# Patient Record
Sex: MALE | HISPANIC OR LATINO | Employment: UNEMPLOYED | URBAN - METROPOLITAN AREA
[De-identification: names, ages, dates, MRNs, and addresses within clinical notes are randomized per-mention and may not be internally consistent; named-entity substitution may affect disease eponyms.]

---

## 2024-01-24 ENCOUNTER — HOSPITAL ENCOUNTER (EMERGENCY)
Facility: MEDICAL CENTER | Age: 29
End: 2024-01-24
Attending: EMERGENCY MEDICINE

## 2024-01-24 VITALS
DIASTOLIC BLOOD PRESSURE: 79 MMHG | HEART RATE: 88 BPM | RESPIRATION RATE: 17 BRPM | WEIGHT: 133.16 LBS | SYSTOLIC BLOOD PRESSURE: 126 MMHG | OXYGEN SATURATION: 97 % | TEMPERATURE: 98.4 F

## 2024-01-24 DIAGNOSIS — N34.2 URETHRITIS: ICD-10-CM

## 2024-01-24 DIAGNOSIS — R74.01 TRANSAMINITIS: ICD-10-CM

## 2024-01-24 LAB
ALBUMIN SERPL BCP-MCNC: 5.1 G/DL (ref 3.2–4.9)
ALBUMIN/GLOB SERPL: 1.5 G/DL
ALP SERPL-CCNC: 109 U/L (ref 30–99)
ALT SERPL-CCNC: 147 U/L (ref 2–50)
ANION GAP SERPL CALC-SCNC: 18 MMOL/L (ref 7–16)
APPEARANCE UR: ABNORMAL
AST SERPL-CCNC: 283 U/L (ref 12–45)
BACTERIA #/AREA URNS HPF: ABNORMAL /HPF
BASOPHILS # BLD AUTO: 0.8 % (ref 0–1.8)
BASOPHILS # BLD: 0.06 K/UL (ref 0–0.12)
BILIRUB SERPL-MCNC: 2.6 MG/DL (ref 0.1–1.5)
BILIRUB UR QL STRIP.AUTO: ABNORMAL
BUN SERPL-MCNC: 12 MG/DL (ref 8–22)
C TRACH DNA SPEC QL NAA+PROBE: NEGATIVE
CALCIUM ALBUM COR SERPL-MCNC: 9.5 MG/DL (ref 8.5–10.5)
CALCIUM SERPL-MCNC: 10.4 MG/DL (ref 8.5–10.5)
CHLORIDE SERPL-SCNC: 92 MMOL/L (ref 96–112)
CO2 SERPL-SCNC: 23 MMOL/L (ref 20–33)
COLOR UR: ABNORMAL
CREAT SERPL-MCNC: 0.55 MG/DL (ref 0.5–1.4)
EOSINOPHIL # BLD AUTO: 0.11 K/UL (ref 0–0.51)
EOSINOPHIL NFR BLD: 1.5 % (ref 0–6.9)
EPI CELLS #/AREA URNS HPF: ABNORMAL /HPF
ERYTHROCYTE [DISTWIDTH] IN BLOOD BY AUTOMATED COUNT: 41.6 FL (ref 35.9–50)
GFR SERPLBLD CREATININE-BSD FMLA CKD-EPI: 137 ML/MIN/1.73 M 2
GLOBULIN SER CALC-MCNC: 3.5 G/DL (ref 1.9–3.5)
GLUCOSE SERPL-MCNC: 88 MG/DL (ref 65–99)
GLUCOSE UR STRIP.AUTO-MCNC: NEGATIVE MG/DL
HCT VFR BLD AUTO: 47.4 % (ref 42–52)
HGB BLD-MCNC: 16.6 G/DL (ref 14–18)
HIV 1+2 AB+HIV1 P24 AG SERPL QL IA: NORMAL
HYALINE CASTS #/AREA URNS LPF: ABNORMAL /LPF
IMM GRANULOCYTES # BLD AUTO: 0.01 K/UL (ref 0–0.11)
IMM GRANULOCYTES NFR BLD AUTO: 0.1 % (ref 0–0.9)
KETONES UR STRIP.AUTO-MCNC: >=160 MG/DL
LEUKOCYTE ESTERASE UR QL STRIP.AUTO: ABNORMAL
LYMPHOCYTES # BLD AUTO: 1.08 K/UL (ref 1–4.8)
LYMPHOCYTES NFR BLD: 14.4 % (ref 22–41)
MCH RBC QN AUTO: 33.2 PG (ref 27–33)
MCHC RBC AUTO-ENTMCNC: 35 G/DL (ref 32.3–36.5)
MCV RBC AUTO: 94.8 FL (ref 81.4–97.8)
MICRO URNS: ABNORMAL
MONOCYTES # BLD AUTO: 1 K/UL (ref 0–0.85)
MONOCYTES NFR BLD AUTO: 13.4 % (ref 0–13.4)
N GONORRHOEA DNA SPEC QL NAA+PROBE: NEGATIVE
NEUTROPHILS # BLD AUTO: 5.22 K/UL (ref 1.82–7.42)
NEUTROPHILS NFR BLD: 69.8 % (ref 44–72)
NITRITE UR QL STRIP.AUTO: POSITIVE
NRBC # BLD AUTO: 0 K/UL
NRBC BLD-RTO: 0 /100 WBC (ref 0–0.2)
PH UR STRIP.AUTO: 5.5 [PH] (ref 5–8)
PLATELET # BLD AUTO: 217 K/UL (ref 164–446)
PMV BLD AUTO: 10.6 FL (ref 9–12.9)
POTASSIUM SERPL-SCNC: 3.7 MMOL/L (ref 3.6–5.5)
PROT SERPL-MCNC: 8.6 G/DL (ref 6–8.2)
PROT UR QL STRIP: 300 MG/DL
RBC # BLD AUTO: 5 M/UL (ref 4.7–6.1)
RBC # URNS HPF: >150 /HPF
RBC UR QL AUTO: ABNORMAL
SODIUM SERPL-SCNC: 133 MMOL/L (ref 135–145)
SP GR UR STRIP.AUTO: 1.02
SPECIMEN SOURCE: NORMAL
T PALLIDUM AB SER QL IA: NORMAL
UROBILINOGEN UR STRIP.AUTO-MCNC: 1 MG/DL
WBC # BLD AUTO: 7.5 K/UL (ref 4.8–10.8)
WBC #/AREA URNS HPF: ABNORMAL /HPF

## 2024-01-24 PROCEDURE — 81001 URINALYSIS AUTO W/SCOPE: CPT

## 2024-01-24 PROCEDURE — 96372 THER/PROPH/DIAG INJ SC/IM: CPT

## 2024-01-24 PROCEDURE — 87591 N.GONORRHOEAE DNA AMP PROB: CPT

## 2024-01-24 PROCEDURE — 85025 COMPLETE CBC W/AUTO DIFF WBC: CPT

## 2024-01-24 PROCEDURE — 36415 COLL VENOUS BLD VENIPUNCTURE: CPT

## 2024-01-24 PROCEDURE — 700102 HCHG RX REV CODE 250 W/ 637 OVERRIDE(OP): Performed by: EMERGENCY MEDICINE

## 2024-01-24 PROCEDURE — 87491 CHLMYD TRACH DNA AMP PROBE: CPT

## 2024-01-24 PROCEDURE — 87389 HIV-1 AG W/HIV-1&-2 AB AG IA: CPT

## 2024-01-24 PROCEDURE — 86780 TREPONEMA PALLIDUM: CPT

## 2024-01-24 PROCEDURE — 700111 HCHG RX REV CODE 636 W/ 250 OVERRIDE (IP): Mod: JZ | Performed by: EMERGENCY MEDICINE

## 2024-01-24 PROCEDURE — 99284 EMERGENCY DEPT VISIT MOD MDM: CPT

## 2024-01-24 PROCEDURE — 80053 COMPREHEN METABOLIC PANEL: CPT

## 2024-01-24 PROCEDURE — A9270 NON-COVERED ITEM OR SERVICE: HCPCS | Performed by: EMERGENCY MEDICINE

## 2024-01-24 RX ORDER — CEFTRIAXONE 500 MG/1
500 INJECTION, POWDER, FOR SOLUTION INTRAMUSCULAR; INTRAVENOUS ONCE
Status: COMPLETED | OUTPATIENT
Start: 2024-01-24 | End: 2024-01-24

## 2024-01-24 RX ORDER — AZITHROMYCIN 250 MG/1
1000 TABLET, FILM COATED ORAL ONCE
Status: COMPLETED | OUTPATIENT
Start: 2024-01-24 | End: 2024-01-24

## 2024-01-24 RX ADMIN — CEFTRIAXONE SODIUM 500 MG: 500 INJECTION, POWDER, FOR SOLUTION INTRAMUSCULAR; INTRAVENOUS at 11:05

## 2024-01-24 RX ADMIN — AZITHROMYCIN DIHYDRATE 1000 MG: 250 TABLET, FILM COATED ORAL at 11:05

## 2024-01-24 NOTE — ED TRIAGE NOTES
Ambulates to triage  Chief Complaint   Patient presents with    Blood in Urine     Noticed it last night, denies any pain     Pt asking to be tested for STD/s, protocol ordered.

## 2024-01-24 NOTE — ED NOTES
PT ambulated with steady gait from ED lobby to [facility]. No changes since triage. Allergies reviewed. PT resting in position of comfort. Chart up for ERP.

## 2024-01-24 NOTE — ED PROVIDER NOTES
ED Provider Note    CHIEF COMPLAINT  Chief Complaint   Patient presents with    Blood in Urine     Noticed it last night, denies any pain       EXTERNAL RECORDS REVIEWED  None available    HPI/ROS  LIMITATION TO HISTORY   None  OUTSIDE HISTORIAN(S):  Mother provided additional history    Mikhail Watson is a 28 y.o. male who presents for evaluation of some blood in his urine.  The patient currently denies any dysuria flank pain fevers chills or any abdominal pain.  Patient is sexually active with more than 3 unprotected partners in the last several months.  He has no known history of STDs but has never been checked.  No high fevers or chills no history of urinary tract infection.  No nausea or vomiting    PAST MEDICAL HISTORY   No significant medical history    SURGICAL HISTORY  patient denies any surgical history  No surgical history  FAMILY HISTORY  No family history on file.  None  SOCIAL HISTORY  Social History     Tobacco Use    Smoking status: Not on file    Smokeless tobacco: Not on file   Substance and Sexual Activity    Alcohol use: Not on file    Drug use: Not on file    Sexual activity: Not on file   Sexually active unprotected multiple partners    CURRENT MEDICATIONS  Home Medications    **Home medications have not yet been reviewed for this encounter**       No regular meds  ALLERGIES  Allergies   Allergen Reactions    Penicillin G        PHYSICAL EXAM  VITAL SIGNS: /89   Pulse 92   Temp 37.1 °C (98.8 °F) (Temporal)   Resp 18   Wt 60.4 kg (133 lb 2.5 oz)   SpO2 96%    Pulse ox interpretation: I interpret this pulse ox as normal.  Constitutional: Alert and oriented x 3, no acute distress  HEENT: Atraumatic normocephalic, pupils are equal round reactive to light extraocular movements are intact. The nares is clear, external ears are normal, mouth shows moist mucous membranes normal dentition for age  Neck: Supple, no JVD no tracheal deviation  Cardiovascular: Regular rate and rhythm no murmur rub  or gallop 2+ pulses peripherally x4  Thorax & Lungs: No respiratory distress, no wheezes rales or rhonchi, No chest tenderness.   GI: Soft nontender nondistended positive bowel sounds, no peritoneal signs  Male genitourinary bilateral testes are descended uncircumcised no chancres, cannot express any purulent discharge from the urethra no hernias no evidence of torsion no lymphadenopathy  Skin: Warm dry no acute rash or lesion  Musculoskeletal: Moving all extremities with full range and 5 of 5 strength no acute  deformity  Neurologic: Cranial nerves III through XII are grossly intact no sensory deficit no cerebellar dysfunction   Psychiatric: Appropriate affect for situation at this time          DIAGNOSTIC STUDIES / PROCEDURES    LABS  Results for orders placed or performed during the hospital encounter of 01/24/24   Urinalysis, Culture if Indicated    Specimen: Urine, Clean Catch   Result Value Ref Range    Color Red (A)     Character Turbid (A)     Specific Gravity 1.022 <1.035    Ph 5.5 5.0 - 8.0    Glucose Negative Negative mg/dL    Ketones >=160 Negative mg/dL    Protein 300 (A) Negative mg/dL    Bilirubin Large (A) Negative    Urobilinogen, Urine 1.0 Negative    Nitrite Positive (A) Negative    Leukocyte Esterase Moderate (A) Negative    Occult Blood Large (A) Negative    Micro Urine Req Microscopic    Chlamydia/GC, PCR (Urine)    Specimen: Urine   Result Value Ref Range    Source urine    T.PALLIDUM AB DRE (Syphilis)   Result Value Ref Range    Syphilis, Treponemal Qual Non-Reactive Non-Reactive   HIV AG/AB Combo Assay Screening   Result Value Ref Range    HIV Ag/Ab Combo Assay Non-Reactive Non Reactive   URINE MICROSCOPIC (W/UA)   Result Value Ref Range    WBC 5-10 (A) /hpf    RBC >150 (A) /hpf    Bacteria Rare (A) None /hpf    Epithelial Cells Few /hpf    Hyaline Cast 0-2 /lpf   CBC WITH DIFFERENTIAL   Result Value Ref Range    WBC 7.5 4.8 - 10.8 K/uL    RBC 5.00 4.70 - 6.10 M/uL    Hemoglobin 16.6 14.0 -  18.0 g/dL    Hematocrit 47.4 42.0 - 52.0 %    MCV 94.8 81.4 - 97.8 fL    MCH 33.2 (H) 27.0 - 33.0 pg    MCHC 35.0 32.3 - 36.5 g/dL    RDW 41.6 35.9 - 50.0 fL    Platelet Count 217 164 - 446 K/uL    MPV 10.6 9.0 - 12.9 fL    Neutrophils-Polys 69.80 44.00 - 72.00 %    Lymphocytes 14.40 (L) 22.00 - 41.00 %    Monocytes 13.40 0.00 - 13.40 %    Eosinophils 1.50 0.00 - 6.90 %    Basophils 0.80 0.00 - 1.80 %    Immature Granulocytes 0.10 0.00 - 0.90 %    Nucleated RBC 0.00 0.00 - 0.20 /100 WBC    Neutrophils (Absolute) 5.22 1.82 - 7.42 K/uL    Lymphs (Absolute) 1.08 1.00 - 4.80 K/uL    Monos (Absolute) 1.00 (H) 0.00 - 0.85 K/uL    Eos (Absolute) 0.11 0.00 - 0.51 K/uL    Baso (Absolute) 0.06 0.00 - 0.12 K/uL    Immature Granulocytes (abs) 0.01 0.00 - 0.11 K/uL    NRBC (Absolute) 0.00 K/uL   Comp Metabolic Panel   Result Value Ref Range    Sodium 133 (L) 135 - 145 mmol/L    Potassium 3.7 3.6 - 5.5 mmol/L    Chloride 92 (L) 96 - 112 mmol/L    Co2 23 20 - 33 mmol/L    Anion Gap 18.0 (H) 7.0 - 16.0    Glucose 88 65 - 99 mg/dL    Bun 12 8 - 22 mg/dL    Creatinine 0.55 0.50 - 1.40 mg/dL    Calcium 10.4 8.5 - 10.5 mg/dL    Correct Calcium 9.5 8.5 - 10.5 mg/dL    AST(SGOT) 283 (H) 12 - 45 U/L    ALT(SGPT) 147 (H) 2 - 50 U/L    Alkaline Phosphatase 109 (H) 30 - 99 U/L    Total Bilirubin 2.6 (H) 0.1 - 1.5 mg/dL    Albumin 5.1 (H) 3.2 - 4.9 g/dL    Total Protein 8.6 (H) 6.0 - 8.2 g/dL    Globulin 3.5 1.9 - 3.5 g/dL    A-G Ratio 1.5 g/dL   ESTIMATED GFR   Result Value Ref Range    GFR (CKD-EPI) 137 >60 mL/min/1.73 m 2        RADIOLOGY  None indicated  COURSE & MEDICAL DECISION MAKING    ED Observation Status? No; Patient does not meet criteria for ED Observation.     INITIAL ASSESSMENT, COURSE AND PLAN  Care Narrative:    This is a very pleasant 28-year-old gentleman presents with his mother for some blood in his urine and some irritation when he urinates.  He has been sexually active with multiple partners without protection.  Here  his urine suggests urethritis rather than classic UTI.  He was treated with standard 500 mg of intramuscular Rocephin as well as 1000 mg of azithromycin.  I was concerned because he had bilirubin and some ketones in his urine therefore some basic laboratory studies were added on.Further history taking the real further history taking reveals the patient has been drinking quite heavily for several months.  His mother corroborated this.  Last drink was 4 days ago.  [place] panel demonstrates elevated AST and ALT with slight hyperbilirubinemia.  I counseled him that he needs to abstain from alcohol or potentially completely stop drinking alcohol as is starting to cause some ill effects on his liver.  I also counseled him to establish care with a primary doctor and to drink plenty of fluids and to return as needed for new or worsening symptoms      ADDITIONAL PROBLEM LIST    DISPOSITION AND DISCUSSIONS  I have discussed management of the patient with the following physicians and PAULA's: None    Discussion of management with other [facility] or appropriate source(s): Discussed with ER pharmacist    Escalation of care considered, and ultimately not performed:IV fluids and diagnostic imaging    Barriers to care at this time, including but not limited to: Patient does not have established PCP.     Decision tools and prescription drugs considered including, but not limited to: None.    FINAL DIAGNOSIS  1. Urethritis        2. Transaminitis                 Electronically signed by: Ernesto Ferrari M.D., 1/24/2024 10:31 AM

## 2024-01-24 NOTE — ED NOTES
Discharge instructions reviewed with patient/ family. Patient verbalizes understanding of follow up care, medication management, and reasons to return to ER. Pt ambulates to lobby with mother.